# Patient Record
Sex: MALE
[De-identification: names, ages, dates, MRNs, and addresses within clinical notes are randomized per-mention and may not be internally consistent; named-entity substitution may affect disease eponyms.]

---

## 2019-03-17 ENCOUNTER — HOSPITAL ENCOUNTER (EMERGENCY)
Dept: HOSPITAL 88 - ER | Age: 84
Discharge: HOME | End: 2019-03-17
Payer: MEDICARE

## 2019-03-17 VITALS — SYSTOLIC BLOOD PRESSURE: 154 MMHG | DIASTOLIC BLOOD PRESSURE: 79 MMHG

## 2019-03-17 VITALS — BODY MASS INDEX: 25.76 KG/M2 | HEIGHT: 68 IN | WEIGHT: 170 LBS

## 2019-03-17 DIAGNOSIS — R42: Primary | ICD-10-CM

## 2019-03-17 DIAGNOSIS — E11.65: ICD-10-CM

## 2019-03-17 LAB
ALBUMIN SERPL-MCNC: 3.5 G/DL (ref 3.5–5)
ALBUMIN/GLOB SERPL: 0.9 {RATIO} (ref 0.8–2)
ALP SERPL-CCNC: 124 IU/L (ref 40–150)
ALT SERPL-CCNC: 25 IU/L (ref 0–55)
ANION GAP SERPL CALC-SCNC: 11.9 MMOL/L (ref 8–16)
BACTERIA URNS QL MICRO: (no result) /HPF
BASOPHILS # BLD AUTO: 0 10*3/UL (ref 0–0.1)
BASOPHILS NFR BLD AUTO: 0.3 % (ref 0–1)
BILIRUB UR QL: NEGATIVE
BUN SERPL-MCNC: 22 MG/DL (ref 7–26)
BUN/CREAT SERPL: 13 (ref 6–25)
CALCIUM SERPL-MCNC: 8.5 MG/DL (ref 8.4–10.2)
CHLORIDE SERPL-SCNC: 101 MMOL/L (ref 98–107)
CK MB SERPL-MCNC: 2.1 NG/ML (ref 0–5)
CK SERPL-CCNC: 148 IU/L (ref 30–200)
CLARITY UR: CLEAR
CO2 SERPL-SCNC: 25 MMOL/L (ref 22–29)
COLOR UR: YELLOW
DEPRECATED INR PLAS: 1.64
DEPRECATED NEUTROPHILS # BLD AUTO: 5.3 10*3/UL (ref 2.1–6.9)
DEPRECATED RBC URNS MANUAL-ACNC: (no result) /HPF (ref 0–5)
EGFRCR SERPLBLD CKD-EPI 2021: 40 ML/MIN (ref 60–?)
EOSINOPHIL # BLD AUTO: 0.2 10*3/UL (ref 0–0.4)
EOSINOPHIL NFR BLD AUTO: 2.5 % (ref 0–6)
EPI CELLS URNS QL MICRO: (no result) /LPF
ERYTHROCYTE [DISTWIDTH] IN CORD BLOOD: 13.8 % (ref 11.7–14.4)
GLOBULIN PLAS-MCNC: 3.7 G/DL (ref 2.3–3.5)
GLUCOSE SERPLBLD-MCNC: 366 MG/DL (ref 74–118)
HCT VFR BLD AUTO: 45.1 % (ref 38.2–49.6)
HGB BLD-MCNC: 14.9 G/DL (ref 14–18)
KETONES UR QL STRIP.AUTO: NEGATIVE
LEUKOCYTE ESTERASE UR QL STRIP.AUTO: NEGATIVE
LYMPHOCYTES # BLD: 2.3 10*3/UL (ref 1–3.2)
LYMPHOCYTES NFR BLD AUTO: 26 % (ref 18–39.1)
MCH RBC QN AUTO: 30.5 PG (ref 28–32)
MCHC RBC AUTO-ENTMCNC: 33 G/DL (ref 31–35)
MCV RBC AUTO: 92.2 FL (ref 81–99)
MONOCYTES # BLD AUTO: 1 10*3/UL (ref 0.2–0.8)
MONOCYTES NFR BLD AUTO: 11 % (ref 4.4–11.3)
NEUTS SEG NFR BLD AUTO: 59.9 % (ref 38.7–80)
NITRITE UR QL STRIP.AUTO: NEGATIVE
PLATELET # BLD AUTO: 152 X10E3/UL (ref 140–360)
POTASSIUM SERPL-SCNC: 4.9 MMOL/L (ref 3.5–5.1)
PROT UR QL STRIP.AUTO: NEGATIVE
PROTHROMBIN TIME: 20 SECONDS (ref 11.9–14.5)
RBC # BLD AUTO: 4.89 X10E6/UL (ref 4.3–5.7)
SODIUM SERPL-SCNC: 133 MMOL/L (ref 136–145)
SP GR UR STRIP: 1.01 (ref 1.01–1.02)
UROBILINOGEN UR STRIP-MCNC: 0.2 MG/DL (ref 0.2–1)
WBC #/AREA URNS HPF: (no result) /HPF (ref 0–5)

## 2019-03-17 PROCEDURE — 81001 URINALYSIS AUTO W/SCOPE: CPT

## 2019-03-17 PROCEDURE — 82553 CREATINE MB FRACTION: CPT

## 2019-03-17 PROCEDURE — 99284 EMERGENCY DEPT VISIT MOD MDM: CPT

## 2019-03-17 PROCEDURE — 36415 COLL VENOUS BLD VENIPUNCTURE: CPT

## 2019-03-17 PROCEDURE — 82948 REAGENT STRIP/BLOOD GLUCOSE: CPT

## 2019-03-17 PROCEDURE — 85610 PROTHROMBIN TIME: CPT

## 2019-03-17 PROCEDURE — 70450 CT HEAD/BRAIN W/O DYE: CPT

## 2019-03-17 PROCEDURE — 84484 ASSAY OF TROPONIN QUANT: CPT

## 2019-03-17 PROCEDURE — 80053 COMPREHEN METABOLIC PANEL: CPT

## 2019-03-17 PROCEDURE — 71045 X-RAY EXAM CHEST 1 VIEW: CPT

## 2019-03-17 PROCEDURE — 82550 ASSAY OF CK (CPK): CPT

## 2019-03-17 PROCEDURE — 85025 COMPLETE CBC W/AUTO DIFF WBC: CPT

## 2019-03-17 PROCEDURE — 93005 ELECTROCARDIOGRAM TRACING: CPT

## 2019-03-17 PROCEDURE — 83735 ASSAY OF MAGNESIUM: CPT

## 2019-03-17 PROCEDURE — 85730 THROMBOPLASTIN TIME PARTIAL: CPT

## 2019-03-17 NOTE — DIAGNOSTIC IMAGING REPORT
EXAM: CHEST SINGLE (PORTABLE)

DATE: 3/17/2019 7:24 PM  

INDICATION: Dizzy.

COMPARISON: None.



FINDINGS:

LINES/TUBES: None



LUNGS: Mild bibasilar subsegmental atelectasis with mild elevation of the right

hemidiaphragm.



PLEURA: No effusions or pneumothorax.



HEART AND MEDIASTINUM: The cardiac silhouette is borderline enlarged.

Considerations of the aortic arch.



BONES AND SOFT TISSUES: No acute findings.



IMPRESSION:

Mild bibasilar subsegmental atelectasis. No significant change.



Signed by: Dr. SHELLY Baker M.D. on 3/17/2019 8:40 PM

## 2019-03-17 NOTE — DIAGNOSTIC IMAGING REPORT
History:Altered mental status



Comparison studies:

None



Technique:

Axial images were obtained from the skull base to the vertex.

Coronal and sagittal images reconstructed from the axial data.

Dose modulation, iterative reconstruction, and/or weight based adjustment 

of the mA/kV was utilized to reduce the radiation dose to as low as reasonably 

achievable.



Intravenous contrast: None



Findings:



Scalp/skull: 

No abnormalities.



Extra-axial spaces: 

No masses.  No fluid collections.



Brain sulci: Mildly prominent.

Ventricles: Mild compensatory dilatation. No hydrocephalus.



Parenchyma: 

Cortical encephalomalacic changes in the right frontal operculum, anterior

insula and inferior precentral cortex (subcentral lobule) are associated with

hypodense gliotic changes in the underlying frontal white matter and with

compensatory dilatation of the right frontal horn. An old lacunar insult is

centered in the head of the right caudate. No masses, hemorrhage, acute or

chronic cortical vascular insults.



Sellar/suprasellar region: No abnormalities.

Craniocervical junction: Patent foramen magnum.  No Chiari one malformation.



Incidental findings:

Atherosclerotic calcifications in the carotid siphons and vertebral arteries..



Impression:



No acute abnormalities.



Chronic findings:

1.  Mild generalized volume loss.

2.  Cortical right MCA frontal vascular insult (operculum, anterior insula and

inferior precentral cortex)

3.  Focal lacunar insult in the head of the left caudate



Signed by: Dr. Omari Sims M.D. on 3/17/2019 8:39 PM

## 2019-08-18 ENCOUNTER — HOSPITAL ENCOUNTER (EMERGENCY)
Dept: HOSPITAL 88 - ER | Age: 84
Discharge: LEFT BEFORE DISCHARGE | End: 2019-08-18
Payer: MEDICARE

## 2019-08-18 ENCOUNTER — HOSPITAL ENCOUNTER (OUTPATIENT)
Dept: HOSPITAL 88 - ER | Age: 84
Setting detail: OBSERVATION
LOS: 1 days | Discharge: HOME | End: 2019-08-19
Attending: INTERNAL MEDICINE | Admitting: INTERNAL MEDICINE
Payer: MEDICARE

## 2019-08-18 VITALS — HEIGHT: 68 IN | WEIGHT: 170 LBS | BODY MASS INDEX: 25.76 KG/M2

## 2019-08-18 VITALS — SYSTOLIC BLOOD PRESSURE: 148 MMHG | DIASTOLIC BLOOD PRESSURE: 63 MMHG

## 2019-08-18 VITALS — DIASTOLIC BLOOD PRESSURE: 72 MMHG | SYSTOLIC BLOOD PRESSURE: 150 MMHG

## 2019-08-18 VITALS — BODY MASS INDEX: 24.4 KG/M2 | HEIGHT: 68 IN | WEIGHT: 161 LBS

## 2019-08-18 VITALS — DIASTOLIC BLOOD PRESSURE: 80 MMHG | SYSTOLIC BLOOD PRESSURE: 182 MMHG

## 2019-08-18 DIAGNOSIS — Z79.01: ICD-10-CM

## 2019-08-18 DIAGNOSIS — I48.2: ICD-10-CM

## 2019-08-18 DIAGNOSIS — E11.649: Primary | ICD-10-CM

## 2019-08-18 DIAGNOSIS — I10: ICD-10-CM

## 2019-08-18 DIAGNOSIS — Z88.0: ICD-10-CM

## 2019-08-18 DIAGNOSIS — Z79.82: ICD-10-CM

## 2019-08-18 DIAGNOSIS — Z86.73: ICD-10-CM

## 2019-08-18 DIAGNOSIS — I48.91: ICD-10-CM

## 2019-08-18 DIAGNOSIS — E11.42: ICD-10-CM

## 2019-08-18 DIAGNOSIS — E78.5: ICD-10-CM

## 2019-08-18 DIAGNOSIS — K21.9: ICD-10-CM

## 2019-08-18 LAB
ALBUMIN SERPL-MCNC: 3.1 G/DL (ref 3.5–5)
ALBUMIN/GLOB SERPL: 1.1 {RATIO} (ref 0.8–2)
ALP SERPL-CCNC: 118 IU/L (ref 40–150)
ALT SERPL-CCNC: 17 IU/L (ref 0–55)
ANION GAP SERPL CALC-SCNC: 9.1 MMOL/L (ref 8–16)
BACTERIA URNS QL MICRO: (no result) /HPF
BASOPHILS # BLD AUTO: 0 10*3/UL (ref 0–0.1)
BASOPHILS NFR BLD AUTO: 0.2 % (ref 0–1)
BILIRUB UR QL: NEGATIVE
BUN SERPL-MCNC: 23 MG/DL (ref 7–26)
BUN/CREAT SERPL: 22 (ref 6–25)
CALCIUM SERPL-MCNC: 8.9 MG/DL (ref 8.4–10.2)
CHLORIDE SERPL-SCNC: 104 MMOL/L (ref 98–107)
CK MB SERPL-MCNC: 2.2 NG/ML (ref 0–5)
CK SERPL-CCNC: 99 IU/L (ref 30–200)
CLARITY UR: CLEAR
CO2 SERPL-SCNC: 30 MMOL/L (ref 22–29)
COLOR UR: YELLOW
DEPRECATED APTT PLAS QN: 42.2 SECONDS (ref 23.8–35.5)
DEPRECATED INR PLAS: 2.98
DEPRECATED NEUTROPHILS # BLD AUTO: 5.5 10*3/UL (ref 2.1–6.9)
DEPRECATED RBC URNS MANUAL-ACNC: (no result) /HPF (ref 0–5)
EGFRCR SERPLBLD CKD-EPI 2021: > 60 ML/MIN (ref 60–?)
EOSINOPHIL # BLD AUTO: 0.3 10*3/UL (ref 0–0.4)
EOSINOPHIL NFR BLD AUTO: 4.1 % (ref 0–6)
EPI CELLS URNS QL MICRO: (no result) /LPF
ERYTHROCYTE [DISTWIDTH] IN CORD BLOOD: 13.7 % (ref 11.7–14.4)
GLOBULIN PLAS-MCNC: 2.7 G/DL (ref 2.3–3.5)
GLUCOSE SERPLBLD-MCNC: 54 MG/DL (ref 74–118)
HCT VFR BLD AUTO: 39 % (ref 38.2–49.6)
HGB BLD-MCNC: 12.8 G/DL (ref 14–18)
KETONES UR QL STRIP.AUTO: NEGATIVE
LEUKOCYTE ESTERASE UR QL STRIP.AUTO: NEGATIVE
LYMPHOCYTES # BLD: 1.5 10*3/UL (ref 1–3.2)
LYMPHOCYTES NFR BLD AUTO: 18.5 % (ref 18–39.1)
MAGNESIUM SERPL-MCNC: 2.2 MG/DL (ref 1.3–2.1)
MCH RBC QN AUTO: 29.3 PG (ref 28–32)
MCHC RBC AUTO-ENTMCNC: 32.8 G/DL (ref 31–35)
MCV RBC AUTO: 89.2 FL (ref 81–99)
MONOCYTES # BLD AUTO: 0.9 10*3/UL (ref 0.2–0.8)
MONOCYTES NFR BLD AUTO: 10.3 % (ref 4.4–11.3)
NEUTS SEG NFR BLD AUTO: 66.7 % (ref 38.7–80)
NITRITE UR QL STRIP.AUTO: NEGATIVE
PLATELET # BLD AUTO: 164 X10E3/UL (ref 140–360)
POTASSIUM SERPL-SCNC: 4.1 MMOL/L (ref 3.5–5.1)
PROT UR QL STRIP.AUTO: NEGATIVE
PROTHROMBIN TIME: 31.7 SECONDS (ref 11.9–14.5)
RBC # BLD AUTO: 4.37 X10E6/UL (ref 4.3–5.7)
SODIUM SERPL-SCNC: 139 MMOL/L (ref 136–145)
SP GR UR STRIP: 1.01 (ref 1.01–1.02)
UROBILINOGEN UR STRIP-MCNC: 0.2 MG/DL (ref 0.2–1)
WBC #/AREA URNS HPF: (no result) /HPF (ref 0–5)

## 2019-08-18 PROCEDURE — 87086 URINE CULTURE/COLONY COUNT: CPT

## 2019-08-18 PROCEDURE — 82553 CREATINE MB FRACTION: CPT

## 2019-08-18 PROCEDURE — 71045 X-RAY EXAM CHEST 1 VIEW: CPT

## 2019-08-18 PROCEDURE — 36415 COLL VENOUS BLD VENIPUNCTURE: CPT

## 2019-08-18 PROCEDURE — 93005 ELECTROCARDIOGRAM TRACING: CPT

## 2019-08-18 PROCEDURE — 85730 THROMBOPLASTIN TIME PARTIAL: CPT

## 2019-08-18 PROCEDURE — 80053 COMPREHEN METABOLIC PANEL: CPT

## 2019-08-18 PROCEDURE — 82948 REAGENT STRIP/BLOOD GLUCOSE: CPT

## 2019-08-18 PROCEDURE — 85025 COMPLETE CBC W/AUTO DIFF WBC: CPT

## 2019-08-18 PROCEDURE — 99284 EMERGENCY DEPT VISIT MOD MDM: CPT

## 2019-08-18 PROCEDURE — 83735 ASSAY OF MAGNESIUM: CPT

## 2019-08-18 PROCEDURE — 72125 CT NECK SPINE W/O DYE: CPT

## 2019-08-18 PROCEDURE — 81001 URINALYSIS AUTO W/SCOPE: CPT

## 2019-08-18 PROCEDURE — 85610 PROTHROMBIN TIME: CPT

## 2019-08-18 PROCEDURE — 70450 CT HEAD/BRAIN W/O DYE: CPT

## 2019-08-18 PROCEDURE — 82550 ASSAY OF CK (CPK): CPT

## 2019-08-18 PROCEDURE — 84484 ASSAY OF TROPONIN QUANT: CPT

## 2019-08-18 RX ADMIN — INSULIN LISPRO SCH UNIT: 100 INJECTION, SOLUTION INTRAVENOUS; SUBCUTANEOUS at 16:30

## 2019-08-18 RX ADMIN — INSULIN LISPRO SCH UNIT: 100 INJECTION, SOLUTION INTRAVENOUS; SUBCUTANEOUS at 21:00

## 2019-08-18 RX ADMIN — HYDRALAZINE HYDROCHLORIDE PRN MG: 20 INJECTION INTRAMUSCULAR; INTRAVENOUS at 17:34

## 2019-08-18 NOTE — DIAGNOSTIC IMAGING REPORT
History:Altered mental status, recent fall



Comparison studies:

Head CT on 3/17/2019



Technique:

Axial images were obtained from the skull base to the vertex.

Coronal and sagittal images reconstructed from the axial data.

Dose modulation, iterative reconstruction, and/or weight based adjustment 

of the mA/kV was utilized to reduce the radiation dose to as low as reasonably 

achievable.



Intravenous contrast: None



Findings:



Scalp/skull: 

No abnormalities.



Extra-axial spaces: 

No masses.  No fluid collections.



Brain sulci: Mildly prominent.

Ventricles: Mild compensatory dilatation. No hydrocephalus.



Parenchyma: 

Cortical encephalomalacic changes in the anterior division of the right MCA

involve the anterior anterior insula and the overlying inferior frontal

degenerative and subcentral lobule. Interval is associated with regional volume

loss and chronic changes in the underlying white matter. A smaller focal

similar insult is centered in the angular gyrus of the left parietal lobe.



No masses, hemorrhage, acute or additional chronic cortical vascular insults.



Sellar/suprasellar region: No abnormalities.

Craniocervical junction: Patent foramen magnum.  No Chiari one malformation.



Incidental findings:

Atherosclerotic calcifications in the carotid siphons .



Impression:



No acute abnormalities.



Chronic findings:

1.  Mild generalized volume loss.

2.  Focal distal cortical right frontal and left parietal MCA vascular insults.

The right insult was present on 3/17/2019 but the now right chronic insult was

not.



Signed by: Dr. Omari Sims M.D. on 8/18/2019 11:43 AM

## 2019-08-18 NOTE — NUR
ARTIE Benson in room to advise pt of benefits of staying and dangers of leaving; 
pt acknowledges understanding and laughs and says "I understand. I just want to 
go home." pt signs ama form with ARTIE Benson and ARTIE Samuel as witnesses

## 2019-08-18 NOTE — HISTORY AND PHYSICAL
HISTORY OF PRESENT ILLNESS:  An 84-year-old male with past medical history positive for

CVA, TIA, hypertension, diabetes, atrial fibrillation, came to the hospital because of

hypoglycemic episode.  The patient is wants to go home.  He is going to leave against

medical advice.  I explained to the patient the consequences like having hypoglycemia

going to hypoglycemic coma at home.  The patient is insisting that he still wants to go

home.  The family member wants him to stay here, but the patient is alert and competent

to make decisions, so I explained the risks of going home including hypoglycemic coma

and even death, but the patient does not want to listen.  He want to go home anyway. 

 

REVIEW OF SYSTEMS:

CARDIOVASCULAR:  No chest pain or palpitation. 

RESPIRATORY:  No shortness of breath.  No cough. 

GASTROINTESTINAL:  No nausea or vomiting.  No diarrhea. 

GENITOURINARY:  No frequency or dysuria.

 

PAST MEDICAL HISTORY:  Positive for diabetes, hypertension, chronic atrial fibrillation,

history of TIA and CVA. 

 

SOCIAL HISTORY:  He smokes.  He drinks alcohol occasionally.

 

PHYSICAL EXAMINATION:

HEART:  Showed irregularly irregular heart rate.  Normal S1, S2 sound. 

LUNGS:  Clear bilaterally. 

ABDOMEN:  Soft. 

EXTREMITIES:  Show no evidence of cyanosis or hematoma. 

NEUROLOGIC:  Alert and oriented x3.  No motor deficits.

FINAL IMPRESSION:  

1. Hypoglycemia.

2. Chronic atrial fibrillation.

3. Cerebrovascular accident.

4. Hypertension.

5. Diabetes mellitus type 2.

 

PLAN OF TREATMENT:  Continue D10W.  Continue monitoring the blood sugar every 2 hours.

We are going to put him of course hold on the hypoglycemic agents as I said the patient

wants to leave against medical advice.  He is aware of the consequences including

hypoglycemic coma and death, but he is not very reasonable right now.  The family is

trying to convince him to stay home, but he going to leave against medical advice. 

 

 

 

 

______________________________

MD CAROLINE Rivers/PHILIP

D:  08/18/2019 12:47:53

T:  08/18/2019 14:46:38

Job #:  [id number]

## 2019-08-18 NOTE — NUR
RECEIVED NOTIFICATION FROM LAB FOR CRITICAL GLUCOSE OF 54, COMPLETED BEDSIDE 
FINGERSTICK NOTED TO BE 94; INFORMED PRIMARY NURSE ARTIE COLES. PT IN STRETCHER 
AWAKE AND ALERT, NAD NOTED; RECEIVING INFUSION OF D10 @75 CC/HR, SEE eMAR.

## 2019-08-18 NOTE — NUR
INITIAL ASSESSMENT COMPLETE, NO DISTRESS NOTED, CALL LIGHT IN REACH, VS AND BS STABLE, TOLD 
TO CALL FOR NEEDS

## 2019-08-18 NOTE — NUR
pt states he wants to go home and does not want to be admitted; Dr Cruz and Dr Kapadia both notified; Dr Cruz in pt room to discuss

## 2019-08-18 NOTE — XMS REPORT
Patient Summary Document

                             Created on: 2019



REYES, GILBERTO

External Reference #: [id number]

: 1935

Sex: Male



Demographics







                          Address                   [address] [place] [address] [id number]



 

                          Home Phone                [phone]

 

                          Preferred Language        Unknown

 

                          Marital Status            Unknown

 

                          [place] Affiliation     Unknown

 

                          Race                      Unknown

 

                          Ethnic Group              Unknown





Author







                          Author                    [facility]

 

                          Address                   Unknown

 

                          Phone                     Unavailable







Care Team Providers







                    Care Team Member Name    Role                Phone

 

                    SWEET, A LAIRD      Unavailable         Unavailable







Problems

This patient has no known problems.



Allergies, Adverse Reactions, Alerts

This patient has no known allergies or adverse reactions.



Medications

This patient has no known medications.



Results







           Test Description    Test Time    Test Comments    Text Results    Atomic Results    Result

 Comments

 

                CHEST SINGLE (PORTABLE)    2019 20:36:00                                                   

                                                       [place] [address]      Patient Name: REYES,GILBERTO                         
         MR #: [id number]                     : 1935                   
               Age/Sex: 84/M  Acct #: [id number]                              
Req #: [id number]  Adm Physician:                                               
      Ordered by: ALFONSO CONRAD MD                            Report #: [id number]
[id number]        Location: ER                                      Room/Bed:         
           
___________________________________________________________________________________________________
   Procedure: [id number] DX/CHEST SINGLE (PORTABLE)  Exam Date:                  
          Exam Time:                                               REPORT 
STATUS: Signed    EXAM: CHEST SINGLE (PORTABLE)   DATE: 3/17/2019 7:24 PM     
INDICATION: Dizzy.   COMPARISON: None.      FINDINGS:   LINES/TUBES: None      
LUNGS: Mild bibasilar subsegmental atelectasis with mild elevation of the right 
 hemidiaphragm.      PLEURA: No effusions or pneumothorax.      HEART AND 
MEDIASTINUM: The cardiac silhouette is borderline enlarged.   Considerations of 
the aortic arch.      BONES AND SOFT TISSUES: No acute findings.      
IMPRESSION:   Mild bibasilar subsegmental atelectasis. No significant change.   
  Signed by: Dr. SHELLY Jackson M.D. on 3/17/2019 8:40 PM        Dictated
By: MIGUELITO JACKSON MD, MD  Electronically Signed By: MIGUELITO JACKSON MD, MD on 
19  Transcribed By: HORACIO on 19       COPY TO:   
ALFONSO CONRAD MD                         

 

                CT BRAIN WO     2019 20:31:00                                                               

                                           [place] [address]    
 Patient Name: REYES,GILBERTO                                   MR #: [id number]
                    : 1935                                   Age/Sex: 
84/M  Acct #: [id number]                              Req #: [id number]  Adm 
Physician:                                                      Ordered by: 
ALFONSO CONRAD MD                            Report #: [id number]        
Location: ER                                      Room/Bed:                     
___________________________________________________________________________________________________
   Procedure: [id number] CT/CT BRAIN WO  Exam Date:                             
Exam Time:                                               REPORT STATUS: Signed  
 History:Altered mental status      Comparison studies:   None      Technique:  
Axial images were obtained from the skull base to the vertex.   Coronal and 
sagittal images reconstructed from the axial data.   Dose modulation, iterative 
reconstruction, and/or weight based adjustment    of the mA/kV was utilized to 
reduce the radiation dose to as low as reasonably    achievable.      
Intravenous contrast: None      Findings:      Scalp/skull:    No abnormalities.
     Extra-axial spaces:    No masses.  No fluid collections.      Brain sulci: 
Mildly prominent.   Ventricles: Mild compensatory dilatation. No hydrocephalus. 
    Parenchyma:    Cortical encephalomalacic changes in the right frontal 
operculum, anterior   insula and inferior precentral cortex (subcentral lobule) 
are associated with   hypodense gliotic changes in the underlying frontal white 
matter and with   compensatory dilatation of the right frontal horn. An old 
lacunar insult is   centered in the head of the right caudate. No masses, 
hemorrhage, acute or   chronic cortical vascular insults.      Sellar/supras
ellar region: No abnormalities.   Craniocervical junction: Patent foramen 
magnum.  No Chiari one malformation.      Incidental findings:   Atherosclerotic
calcifications in the carotid siphons and vertebral arteries..      Impression: 
    No acute abnormalities.      Chronic findings:   1.  Mild generalized volume
loss.   2.  Cortical right MCA frontal vascular insult (operculum, anterior 
insula and   inferior precentral cortex)   3.  Focal lacunar insult in the head 
of the left caudate      Signed by: Dr. Omari Sims M.D. on 3/17/2019 
8:39 PM        Dictated By: OMARI SIMS MD, MD  Electronically Signed 
By: OMARI SIMS MD, MD on 19  Transcribed By: HORACIO on 
19       COPY TO:   ALFONSO CONRAD MD

## 2019-08-18 NOTE — NUR
Dr Cruz advises pt of benefits of staying in the hosp and dangers of leaving 
up to and including death; pt states, "ok, I'll take my chances." Dr Cruz asks 
pt questions to make sure pt is alert and oriented and pt answers all questions 
appropriately and states, "I want to go"

## 2019-08-18 NOTE — NUR
RECEIVED TO RM AAOX3, NO DISTRESS NOTED,UPDATED ON POC VOICED UNDERSTANDING, DENIES PAIN AT 
THIS TIME, IVF INFUSING TO R AC 18G NO SS OF INFILTRATION NOTED, BRUISE NOTED TO RL BACK, 
ORIENTED TO RM, NO OTHER CO VOCIED CALL LIGHT IN REACH WILL CONTINUE TO MONITOR

## 2019-08-18 NOTE — DIAGNOSTIC IMAGING REPORT
Examination: Single AP view of the chest.



COMPARISON: AP chest 3/17/2019



INDICATION: Hypoglycemia

    

IMPRESSION:

 

1.  Lines and Tubes: None

2.  Lungs are well-inflated.  No consolidation or effusion.

3.  Mild prominence of the cardiac silhouette, which may be partly due to AP

portable projection.  Central pulmonary venous congestion.

4.  No acute bony abnormalities.



Signed by: Dr. Odell Jenkins M.D. on 8/18/2019 11:23 AM

## 2019-08-18 NOTE — DIAGNOSTIC IMAGING REPORT
History: Fall

Comparison studies: None



Technique: 

Axial images were obtained through the cervical region..

Coronal and sagittal images reconstructed from the axial data.

Dose modulation, iterative reconstruction, and/or weight based adjustment 

of the mA/kV was utilized to reduce the radiation dose to as low as reasonably 

achievable.



Intravenous contrast: None



Findings:



Fractures: None.

Soft tissues: No gross abnormalities.



Atlantoaxial articulation: Intact.

Alignment: Normal lordosis. No scoliosis.

Cervicomedullary junction: No abnormalities. The foramen magnum is patent.



Vertebrae: 

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

No infection or neoplasm.



Degenerative changes: 

Mildly degenerated discs from C4 to T2 (mostly anterior osteophytes).

Foraminal stenosis, moderate at C3-4 and C5-6 due to facet and uncovertebral

arthrosis.

Moderate degenerative spinal canal stenosis at C5-6 due to a disc osteophyte

complex

Superimposed facet arthrosis at C7-T1.



Incidental coarse atherosclerotic calcifications at the carotid bulbs.



IMPRESSION:



1.  No acute abnormalities.



2.  Cannot adequately evaluate for ligament, spinal cord and or vascular

abnormalities.



3.  Degenerative changes as described.



Signed by: Dr. Omari Sims M.D. on 8/18/2019 11:48 AM

## 2019-08-19 VITALS — SYSTOLIC BLOOD PRESSURE: 171 MMHG | DIASTOLIC BLOOD PRESSURE: 88 MMHG

## 2019-08-19 VITALS — DIASTOLIC BLOOD PRESSURE: 79 MMHG | SYSTOLIC BLOOD PRESSURE: 149 MMHG

## 2019-08-19 VITALS — SYSTOLIC BLOOD PRESSURE: 147 MMHG | DIASTOLIC BLOOD PRESSURE: 81 MMHG

## 2019-08-19 VITALS — SYSTOLIC BLOOD PRESSURE: 189 MMHG | DIASTOLIC BLOOD PRESSURE: 91 MMHG

## 2019-08-19 VITALS — SYSTOLIC BLOOD PRESSURE: 172 MMHG | DIASTOLIC BLOOD PRESSURE: 92 MMHG

## 2019-08-19 VITALS — SYSTOLIC BLOOD PRESSURE: 176 MMHG | DIASTOLIC BLOOD PRESSURE: 81 MMHG

## 2019-08-19 VITALS — DIASTOLIC BLOOD PRESSURE: 65 MMHG | SYSTOLIC BLOOD PRESSURE: 132 MMHG

## 2019-08-19 VITALS — DIASTOLIC BLOOD PRESSURE: 91 MMHG | SYSTOLIC BLOOD PRESSURE: 189 MMHG

## 2019-08-19 LAB
ALBUMIN SERPL-MCNC: 3.3 G/DL (ref 3.5–5)
ALBUMIN/GLOB SERPL: 1.1 {RATIO} (ref 0.8–2)
ALP SERPL-CCNC: 130 IU/L (ref 40–150)
ALT SERPL-CCNC: 17 IU/L (ref 0–55)
ANION GAP SERPL CALC-SCNC: 8.6 MMOL/L (ref 8–16)
BASOPHILS # BLD AUTO: 0 10*3/UL (ref 0–0.1)
BASOPHILS NFR BLD AUTO: 0.4 % (ref 0–1)
BUN SERPL-MCNC: 17 MG/DL (ref 7–26)
BUN/CREAT SERPL: 18 (ref 6–25)
CALCIUM SERPL-MCNC: 9 MG/DL (ref 8.4–10.2)
CHLORIDE SERPL-SCNC: 101 MMOL/L (ref 98–107)
CO2 SERPL-SCNC: 30 MMOL/L (ref 22–29)
DEPRECATED NEUTROPHILS # BLD AUTO: 5.3 10*3/UL (ref 2.1–6.9)
EGFRCR SERPLBLD CKD-EPI 2021: > 60 ML/MIN (ref 60–?)
EOSINOPHIL # BLD AUTO: 0.5 10*3/UL (ref 0–0.4)
EOSINOPHIL NFR BLD AUTO: 5.7 % (ref 0–6)
ERYTHROCYTE [DISTWIDTH] IN CORD BLOOD: 13.6 % (ref 11.7–14.4)
GLOBULIN PLAS-MCNC: 3.1 G/DL (ref 2.3–3.5)
GLUCOSE SERPLBLD-MCNC: 81 MG/DL (ref 74–118)
HCT VFR BLD AUTO: 43.2 % (ref 38.2–49.6)
HGB BLD-MCNC: 13.9 G/DL (ref 14–18)
LYMPHOCYTES # BLD: 2.1 10*3/UL (ref 1–3.2)
LYMPHOCYTES NFR BLD AUTO: 23.9 % (ref 18–39.1)
MCH RBC QN AUTO: 28.9 PG (ref 28–32)
MCHC RBC AUTO-ENTMCNC: 32.2 G/DL (ref 31–35)
MCV RBC AUTO: 89.8 FL (ref 81–99)
MONOCYTES # BLD AUTO: 1 10*3/UL (ref 0.2–0.8)
MONOCYTES NFR BLD AUTO: 11 % (ref 4.4–11.3)
NEUTS SEG NFR BLD AUTO: 58.8 % (ref 38.7–80)
PLATELET # BLD AUTO: 171 X10E3/UL (ref 140–360)
POTASSIUM SERPL-SCNC: 3.6 MMOL/L (ref 3.5–5.1)
RBC # BLD AUTO: 4.81 X10E6/UL (ref 4.3–5.7)
SODIUM SERPL-SCNC: 136 MMOL/L (ref 136–145)

## 2019-08-19 RX ADMIN — INSULIN LISPRO SCH UNIT: 100 INJECTION, SOLUTION INTRAVENOUS; SUBCUTANEOUS at 16:30

## 2019-08-19 RX ADMIN — INSULIN LISPRO SCH UNIT: 100 INJECTION, SOLUTION INTRAVENOUS; SUBCUTANEOUS at 07:30

## 2019-08-19 RX ADMIN — INSULIN LISPRO SCH UNIT: 100 INJECTION, SOLUTION INTRAVENOUS; SUBCUTANEOUS at 12:37

## 2019-08-19 RX ADMIN — HYDRALAZINE HYDROCHLORIDE PRN MG: 20 INJECTION INTRAMUSCULAR; INTRAVENOUS at 09:01

## 2019-08-19 NOTE — NUR
Pt leaving unit at this time. Daughter is present. Pt given juice as requested by patient. 
Pt given prescription written by Dr. Kapadia. Discharge paperwork discussed with pt and pts 
daughter. Pt and family have no questions. Pt denies pain at this time. Vital signs stable.

## 2019-08-19 NOTE — NUR
RECEIVED PATIENT AWAKE RESTING IN BED NO S/S OF DISTRESS. BED LOW, WHEELS LOCKED, SIDE RAILS 
X2. CALL LIGHT IN REACH WILL CONTINUE TO MONITOR PATIENT.

## 2019-08-19 NOTE — NUR
Report received at this time. Pt resting comfortably sitting at side of bed. Pt daughter is 
present at bedside. Pt has no c/o dizziness or pain at this time. Will continue to monitor.

## 2019-08-20 NOTE — DISCHARGE SUMMARY
HISTORY:  An 84-year-old  male with past medical history positive for diabetes,

hypertension, history of chronic atrial fibrillation, and coronary artery disease, came

here because of hypoglycemia.  At the beginning, he was refusing to stay in the

hospital, but later his family convinced him to stay.  Blood sugar is much better right

now. 

 

PHYSICAL EXAMINATION:

VITAL SIGNS:  Blood pressure 149/79, temperature 96.0, heart rate 64 per minute,

respiratory rate 19 per minute, and oxygen saturation 97%. 

HEART:  Showed irregularly irregular heart rate.  Normal S1 and S2 sound. 

LUNGS:  Clear bilaterally. 

ABDOMEN:  Soft.

LABORATORY DATA:  On the BMP; sodium 136, potassium 3.6, chloride 101, CO2 of 30, BUN

17, creatinine 0.82, glucose 81.  CBC; white blood count 8.94, hemoglobin 13.9,

hematocrit 43.2, and platelet count 171,000.  AST 24, ALT 17, total bilirubin 0.9,

alkaline phosphatase 130. 

 

FINAL IMPRESSION:  

1. Episode of hypoglycemia, which is resolved.

2. Hypertension.

3. 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

4. Chronic atrial fibrillation.

5. Hypertension.

 

PLAN OF TREATMENT:  The patient once in his home, once the blood sugar is more than 150,

he will resume the Lantus, which we are going to decrease from 30 to 20 units in the

morning and from 20 to 15 units in the evening.  Continue monitoring blood sugar before

meals and at bedtime.  The patient is aware of symptoms of hypoglycemia and how to

regulate his blood sugar.  His daughter will be involved in the case.  Continue rest of

the home medications. 

 

 

 

 

______________________________

MD CAROLINE Rivers/PHILIP

D:  08/19/2019 20:16:25

T:  08/20/2019 05:46:05

Job #:  [id number]

## 2019-11-10 ENCOUNTER — HOSPITAL ENCOUNTER (EMERGENCY)
Dept: HOSPITAL 88 - ER | Age: 84
LOS: 1 days | Discharge: HOME | End: 2019-11-11
Payer: COMMERCIAL

## 2019-11-10 VITALS — WEIGHT: 161 LBS | BODY MASS INDEX: 24.4 KG/M2 | HEIGHT: 68 IN

## 2019-11-10 DIAGNOSIS — K59.00: Primary | ICD-10-CM

## 2019-11-10 DIAGNOSIS — K64.4: ICD-10-CM

## 2019-11-10 PROCEDURE — 36415 COLL VENOUS BLD VENIPUNCTURE: CPT

## 2019-11-10 PROCEDURE — 74018 RADEX ABDOMEN 1 VIEW: CPT

## 2019-11-10 PROCEDURE — 99283 EMERGENCY DEPT VISIT LOW MDM: CPT

## 2019-11-10 PROCEDURE — 82948 REAGENT STRIP/BLOOD GLUCOSE: CPT

## 2019-11-11 VITALS — DIASTOLIC BLOOD PRESSURE: 93 MMHG | SYSTOLIC BLOOD PRESSURE: 158 MMHG

## 2019-11-11 NOTE — DIAGNOSTIC IMAGING REPORT
Exam: KUB - 2 views



Clinical History: Constipation.



Comparison: None.



Findings/Impression

Examination is limited by motion and technique. Moderate amount of stool in the

colon. Air-filled colonic loops. Nonspecific bowel gas pattern. No evidence of

large volume pneumoperitoneum. No acute osseous abnormality. 



Signed by: Dr. Irene Coker MD on 11/11/2019 1:29 AM

## 2019-12-13 ENCOUNTER — HOSPITAL ENCOUNTER (OUTPATIENT)
Dept: HOSPITAL 88 - ER | Age: 84
Setting detail: OBSERVATION
Discharge: LEFT BEFORE DISCHARGE | End: 2019-12-13
Attending: INTERNAL MEDICINE | Admitting: INTERNAL MEDICINE
Payer: MEDICARE

## 2019-12-13 VITALS — WEIGHT: 161 LBS | HEIGHT: 68 IN | BODY MASS INDEX: 24.4 KG/M2

## 2019-12-13 DIAGNOSIS — Z79.4: ICD-10-CM

## 2019-12-13 DIAGNOSIS — E78.00: ICD-10-CM

## 2019-12-13 DIAGNOSIS — I12.9: ICD-10-CM

## 2019-12-13 DIAGNOSIS — E11.649: Primary | ICD-10-CM

## 2019-12-13 DIAGNOSIS — E11.22: ICD-10-CM

## 2019-12-13 DIAGNOSIS — N18.3: ICD-10-CM

## 2019-12-13 DIAGNOSIS — Z86.73: ICD-10-CM

## 2019-12-13 DIAGNOSIS — Z88.0: ICD-10-CM

## 2019-12-13 LAB
ALBUMIN SERPL-MCNC: 3.4 G/DL (ref 3.5–5)
ALBUMIN/GLOB SERPL: 1.1 {RATIO} (ref 0.8–2)
ALP SERPL-CCNC: 132 IU/L (ref 40–150)
ALT SERPL-CCNC: 24 IU/L (ref 0–55)
ANION GAP SERPL CALC-SCNC: 12.2 MMOL/L (ref 8–16)
BACTERIA URNS QL MICRO: (no result) /HPF
BASOPHILS # BLD AUTO: 0 10*3/UL (ref 0–0.1)
BASOPHILS NFR BLD AUTO: 0.2 % (ref 0–1)
BILIRUB UR QL: NEGATIVE
BUN SERPL-MCNC: 22 MG/DL (ref 7–26)
BUN/CREAT SERPL: 18 (ref 6–25)
CALCIUM SERPL-MCNC: 9.2 MG/DL (ref 8.4–10.2)
CHLORIDE SERPL-SCNC: 102 MMOL/L (ref 98–107)
CK MB SERPL-MCNC: 2.3 NG/ML (ref 0–5)
CK SERPL-CCNC: 79 IU/L (ref 30–200)
CLARITY UR: CLEAR
CO2 SERPL-SCNC: 29 MMOL/L (ref 22–29)
COLOR UR: YELLOW
DEPRECATED APTT PLAS QN: 42.3 SECONDS (ref 23.8–35.5)
DEPRECATED INR PLAS: 2.01
DEPRECATED NEUTROPHILS # BLD AUTO: 6.1 10*3/UL (ref 2.1–6.9)
DEPRECATED RBC URNS MANUAL-ACNC: (no result) /HPF (ref 0–5)
EGFRCR SERPLBLD CKD-EPI 2021: 57 ML/MIN (ref 60–?)
EOSINOPHIL # BLD AUTO: 0.1 10*3/UL (ref 0–0.4)
EOSINOPHIL NFR BLD AUTO: 1.5 % (ref 0–6)
EPI CELLS URNS QL MICRO: (no result) /LPF
ERYTHROCYTE [DISTWIDTH] IN CORD BLOOD: 14.6 % (ref 11.7–14.4)
GLOBULIN PLAS-MCNC: 3.2 G/DL (ref 2.3–3.5)
GLUCOSE SERPLBLD-MCNC: 95 MG/DL (ref 74–118)
HCT VFR BLD AUTO: 45.3 % (ref 38.2–49.6)
HGB BLD-MCNC: 14.6 G/DL (ref 14–18)
KETONES UR QL STRIP.AUTO: NEGATIVE
LEUKOCYTE ESTERASE UR QL STRIP.AUTO: NEGATIVE
LYMPHOCYTES # BLD: 1.5 10*3/UL (ref 1–3.2)
LYMPHOCYTES NFR BLD AUTO: 18.1 % (ref 18–39.1)
MCH RBC QN AUTO: 28.7 PG (ref 28–32)
MCHC RBC AUTO-ENTMCNC: 32.2 G/DL (ref 31–35)
MCV RBC AUTO: 89 FL (ref 81–99)
MONOCYTES # BLD AUTO: 0.7 10*3/UL (ref 0.2–0.8)
MONOCYTES NFR BLD AUTO: 7.9 % (ref 4.4–11.3)
NEUTS SEG NFR BLD AUTO: 71.9 % (ref 38.7–80)
NITRITE UR QL STRIP.AUTO: NEGATIVE
PLATELET # BLD AUTO: 166 X10E3/UL (ref 140–360)
POTASSIUM SERPL-SCNC: 4.2 MMOL/L (ref 3.5–5.1)
PROT UR QL STRIP.AUTO: NEGATIVE
PROTHROMBIN TIME: 23.4 SECONDS (ref 11.9–14.5)
RBC # BLD AUTO: 5.09 X10E6/UL (ref 4.3–5.7)
SODIUM SERPL-SCNC: 139 MMOL/L (ref 136–145)
SP GR UR STRIP: 1.01 (ref 1.01–1.02)
UROBILINOGEN UR STRIP-MCNC: 0.2 MG/DL (ref 0.2–1)
WBC #/AREA URNS HPF: (no result) /HPF (ref 0–5)

## 2019-12-13 PROCEDURE — 99284 EMERGENCY DEPT VISIT MOD MDM: CPT

## 2019-12-13 PROCEDURE — 85025 COMPLETE CBC W/AUTO DIFF WBC: CPT

## 2019-12-13 PROCEDURE — 80053 COMPREHEN METABOLIC PANEL: CPT

## 2019-12-13 PROCEDURE — 70450 CT HEAD/BRAIN W/O DYE: CPT

## 2019-12-13 PROCEDURE — 85610 PROTHROMBIN TIME: CPT

## 2019-12-13 PROCEDURE — 82550 ASSAY OF CK (CPK): CPT

## 2019-12-13 PROCEDURE — 71045 X-RAY EXAM CHEST 1 VIEW: CPT

## 2019-12-13 PROCEDURE — 81001 URINALYSIS AUTO W/SCOPE: CPT

## 2019-12-13 PROCEDURE — 85730 THROMBOPLASTIN TIME PARTIAL: CPT

## 2019-12-13 PROCEDURE — 36415 COLL VENOUS BLD VENIPUNCTURE: CPT

## 2019-12-13 PROCEDURE — 82553 CREATINE MB FRACTION: CPT

## 2019-12-13 PROCEDURE — 93005 ELECTROCARDIOGRAM TRACING: CPT

## 2019-12-13 PROCEDURE — 84484 ASSAY OF TROPONIN QUANT: CPT

## 2019-12-13 NOTE — NUR
pt daughter at bedside attempting to talk pt into staying; dr cano also at pt 
bedside with daughter pt remains adamant about leaving ama; dr cano explains 
again the risks/benefits of signing ama/staying in the hosp; pt states he wants 
to go home

## 2019-12-13 NOTE — HISTORY AND PHYSICAL
HISTORY OF PRESENT ILLNESS:  The patient is an 84-year-old male, who had a past medical

history mainly positive for CVA, hypertension, diabetes, came to the hospital

unresponsive due to hypoglycemia.  The patient received IV glucose.  He is much more

alert and awake today, but he wants to leave against medical advice.  I explained the

patient that he can slip into coma due to the fact that the oral hypoglycemic agents may

last between 24-48 hours before they get out of his system.  The patient still is

adamant about leaving against medical advice, so he is aware of the consequences.  He is

aware that he can slip into coma and die and he still wants to leave against medical

advice.  The daughter is on the way.  We are going to have her talk to him to see he can

be more reasonable; every time he came to the hospital, he immediately wants to leave

against medical advice. 

 

REVIEW OF SYSTEMS:

CARDIOVASCULAR:  No chest pain or palpitation. 

RESPIRATORY:  No shortness of breath.  No cough. 

GASTROINTESTINAL:  No nausea or vomiting.  No diarrhea. 

GENITOURINARY:  No frequency.  No dysuria.

 

ALLERGIES:  HE IS ALLERGIC TO PENICILLIN.

 

SOCIAL HISTORY:  He smokes.  He does not drink.

 

PAST MEDICAL HISTORY:  Mainly positive for diabetes, hypertension, and

hypercholesterolemia. 

 

PHYSICAL EXAMINATION:

HEART:  Showed regular rhythm.  Normal S1 and S2 sound. 

EXTREMITIES:  Show no evidence of cyanosis or hematoma.

LABORATORY DATA:  On the blood work, we have BMP; sodium 139, potassium 4.2, chloride

102, CO2 of 29, BUN 22, creatinine 1.22, glucose 195.  On CBC; white blood count 8.45,

hemoglobin 14.6, hematocrit 45.3, platelet count 133,000, PT 23.4, INR 2.01, PTT 42.3,

AST 27, ALT 24, total bilirubin 1.2, alkaline phosphatase 132. 

 

FINAL IMPRESSION:  

1. Episode of hypoglycemia. 

2. 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 

3. Chronic renal failure stage 3 secondary to the possible diabetic nephropathy.

4. Hypertension with chronic renal failure.

5. History of cerebrovascular accident.  

6. Hypercholesterolemia.

 

PLAN OF TREATMENT:  Going to resume home medication except insulin and oral hypoglycemic

agents.  The patient, as I said, has been told to stay, but the patient wants to leave

against medical advice.  The daughter is on the way to try to convince him.  He is aware

of the consequences most likely the hypoglycemia will return since the half-life of oral

hypoglycemic 

agents is more than between 24-48 hours.  The patient is aware of that and he still

wants to leave against medical advice. 

 

 

 

 

______________________________

MD CAROLINE Rivers/PHILIP

D:  12/13/2019 14:26:21

T:  12/13/2019 21:37:40

Job #:  [id number]

## 2019-12-13 NOTE — NUR
pt states he does not want to stay and wants to sign out ama; pt daughter 
called and notified states she will try to come back to talk with pt about 
staying; md notified

## 2019-12-13 NOTE — NUR
pt signed out ama stating he understands and nurse explains to him he can 
always come back for new or worsening symptoms

## 2019-12-13 NOTE — NUR
dr cano talked with pt about signing out ama explaining the benefits of 
continuing hospitalization due to pt condition; pt acknowledges his condition 
and is aware of benefits of staying continues to states he wants to go home and 
that he does not like hospitals; md explains risks of leaving ama up to and 
including death pt who is a&o x 3 answers all questions appropriately states he 
understands and would still prefer to go home; pt made aware that he can always 
contact his physician, call 911, or return to er for new or worsening symptoms; 
md tells pt to at least stay long enough for family member to pick him up and 
if he still feels he wants to sign out ama then, he can do so at that time; pt 
agrees

## 2019-12-13 NOTE — DIAGNOSTIC IMAGING REPORT
EXAMINATION: Head CT 



HISTORY: Alteration of consciousness, hypoglycemia, status post fall with head

trauma

COMPARISON: Head CT 8/18/2019

TECHNIQUE: Multidetector axial images were obtained without contrast from the

foramen magnum to the vertex . The images were reconstructed using brain and

bone algorithms.  Thin section brain images were reformatted into coronal and

sagittal planes.

Image quality: Motion/streaking artifact limits the evaluation of the skull

base and posterior cranial fossa.

Dose modulation, iterative reconstruction, and/or weight based adjustment of

the mA/kV was utilized to reduce the radiation dose to as low as reasonably

achievable.





FINDINGS:



     Parenchyma: 

Unchanged cortical and subcortical encephalomalacia in the anterior division of

the right MCA involving the anterior anterior insula and the overlying inferior

frontal gyrus and subcentral lobule. Associated with regional volume loss and

chronic changes in the underlying white matter. A smaller focal similar insult

is centered in the angular gyrus of the left parietal lobe left lateral

occipital cortex.

 No  mass or hemorrhage. No CT evidence of acute territorial vascular insult. 

    

     Extra-axial spaces:No abnormal density. No extra-axial fluid collections 

     Brain volume: Normal for age. 

     Ventricles: No hydrocephalus or displacement.  

     Arteries: No density suggestive of thrombus. 

     Dural sinuses: No abnormal density. 

     Extra-axial spaces: No abnormal density. 

     Foramen magnum: No mass, Chiari malformation, or basilar invagination. 

     Sella: No obvious mass.  

     Paranasal/mastoid sinuses: Imaged portions unremarkable. 

     Skull/Scalp: No lytic or blastic lesions.  No fractures. 



IMPRESSION:



1.  No acute posttraumatic intracranial abnormalities, particularly no

hemorrhage.

2.  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

compared to head CT of 8/18/2019.



Signed by: Dr. Janny Duke M.D. on 12/13/2019 12:19 PM

## 2019-12-13 NOTE — DIAGNOSTIC IMAGING REPORT
EXAMINATION:  CHEST SINGLE (PORTABLE)    



INDICATION: Altered mental status. 



COMPARISON:  Chest radiograph 8/18/19. 

     

FINDINGS:

TUBES and LINES:  None.



LUNGS:  Lungs are well inflated.  There is no evidence of pneumonia or

pulmonary edema.



PLEURA:  No pleural effusion or pneumothorax. 



HEART AND MEDIASTINUM:  The cardiomediastinal silhouette is unremarkable. There

are atherosclerotic calcifications within the aorta.



BONES AND SOFT TISSUES:  No acute osseous abnormality.



UPPER ABDOMEN: No free air under the diaphragm.    



IMPRESSION: 

No acute radiographic abnormality.



Signed by: Dr. Irene Coker MD on 12/13/2019 11:19 AM

## 2021-04-13 ENCOUNTER — HOSPITAL ENCOUNTER (OUTPATIENT)
Dept: HOSPITAL 88 - CT | Age: 86
End: 2021-04-13
Attending: INTERNAL MEDICINE
Payer: MEDICARE

## 2021-04-13 DIAGNOSIS — R42: Primary | ICD-10-CM

## 2021-04-13 PROCEDURE — 70450 CT HEAD/BRAIN W/O DYE: CPT

## 2021-07-09 ENCOUNTER — HOSPITAL ENCOUNTER (OUTPATIENT)
Dept: HOSPITAL 88 - MRI | Age: 86
End: 2021-07-09
Attending: INTERNAL MEDICINE
Payer: MEDICARE

## 2021-07-09 DIAGNOSIS — I48.91: ICD-10-CM

## 2021-07-09 DIAGNOSIS — I10: ICD-10-CM

## 2021-07-09 DIAGNOSIS — R74.8: Primary | ICD-10-CM

## 2021-07-09 DIAGNOSIS — R10.13: ICD-10-CM

## 2021-07-09 DIAGNOSIS — Z79.01: ICD-10-CM

## 2021-07-09 DIAGNOSIS — I11.9: ICD-10-CM

## 2021-07-09 LAB
ALBUMIN SERPL-MCNC: 3.1 G/DL (ref 3.5–5)
ALP SERPL-CCNC: 209 IU/L (ref 40–150)
ALT SERPL-CCNC: 37 IU/L (ref 0–55)
BILIRUB CONJ SERPL-MCNC: 0.8 MG/DL (ref 0–0.5)

## 2021-07-09 PROCEDURE — 36415 COLL VENOUS BLD VENIPUNCTURE: CPT

## 2021-07-09 PROCEDURE — 80076 HEPATIC FUNCTION PANEL: CPT

## 2021-07-09 PROCEDURE — 74183 MRI ABD W/O CNTR FLWD CNTR: CPT

## 2022-01-12 LAB
ANION GAP SERPL CALC-SCNC: 5.9 MMOL/L (ref 8–16)
BASOPHILS # BLD AUTO: 0 10*3/UL (ref 0–0.1)
BASOPHILS NFR BLD AUTO: 0.1 % (ref 0–1)
BUN SERPL-MCNC: 26 MG/DL (ref 7–26)
BUN/CREAT SERPL: 20 (ref 6–25)
CALCIUM SERPL-MCNC: 8.7 MG/DL (ref 8.4–10.2)
CHLORIDE SERPL-SCNC: 99 MMOL/L (ref 98–107)
CO2 SERPL-SCNC: 33 MMOL/L (ref 22–29)
DEPRECATED NEUTROPHILS # BLD AUTO: 5.1 10*3/UL (ref 2.1–6.9)
EGFRCR SERPLBLD CKD-EPI 2021: 53 ML/MIN (ref 60–?)
EOSINOPHIL # BLD AUTO: 0.2 10*3/UL (ref 0–0.4)
EOSINOPHIL NFR BLD AUTO: 1.9 % (ref 0–6)
ERYTHROCYTE [DISTWIDTH] IN CORD BLOOD: 12.7 % (ref 11.7–14.4)
GLUCOSE SERPLBLD-MCNC: 338 MG/DL (ref 74–118)
HCT VFR BLD AUTO: 41.2 % (ref 38.2–49.6)
HGB BLD-MCNC: 13.5 G/DL (ref 14–18)
LYMPHOCYTES # BLD: 2.3 10*3/UL (ref 1–3.2)
LYMPHOCYTES NFR BLD AUTO: 27.1 % (ref 18–39.1)
MCH RBC QN AUTO: 30.5 PG (ref 28–32)
MCHC RBC AUTO-ENTMCNC: 32.8 G/DL (ref 31–35)
MCV RBC AUTO: 93.2 FL (ref 81–99)
MONOCYTES # BLD AUTO: 0.8 10*3/UL (ref 0.2–0.8)
MONOCYTES NFR BLD AUTO: 9 % (ref 4.4–11.3)
NEUTS SEG NFR BLD AUTO: 61.5 % (ref 38.7–80)
PLATELET # BLD AUTO: 170 X10E3/UL (ref 140–360)
POTASSIUM SERPL-SCNC: 3.9 MMOL/L (ref 3.5–5.1)
RBC # BLD AUTO: 4.42 X10E6/UL (ref 4.3–5.7)
SODIUM SERPL-SCNC: 134 MMOL/L (ref 136–145)

## 2022-01-14 ENCOUNTER — HOSPITAL ENCOUNTER (OUTPATIENT)
Dept: HOSPITAL 88 - OR | Age: 87
Discharge: HOME | End: 2022-01-14
Attending: UROLOGY
Payer: MEDICARE

## 2022-01-14 VITALS — DIASTOLIC BLOOD PRESSURE: 89 MMHG | SYSTOLIC BLOOD PRESSURE: 157 MMHG

## 2022-01-14 DIAGNOSIS — Z79.02: ICD-10-CM

## 2022-01-14 DIAGNOSIS — N47.1: Primary | ICD-10-CM

## 2022-01-14 DIAGNOSIS — N13.8: ICD-10-CM

## 2022-01-14 DIAGNOSIS — E11.9: ICD-10-CM

## 2022-01-14 DIAGNOSIS — Z88.7: ICD-10-CM

## 2022-01-14 DIAGNOSIS — N39.0: ICD-10-CM

## 2022-01-14 DIAGNOSIS — E78.5: ICD-10-CM

## 2022-01-14 DIAGNOSIS — N40.0: ICD-10-CM

## 2022-01-14 DIAGNOSIS — N35.919: ICD-10-CM

## 2022-01-14 DIAGNOSIS — N21.0: ICD-10-CM

## 2022-01-14 DIAGNOSIS — Z20.822: ICD-10-CM

## 2022-01-14 DIAGNOSIS — I49.3: ICD-10-CM

## 2022-01-14 DIAGNOSIS — N47.7: ICD-10-CM

## 2022-01-14 DIAGNOSIS — N32.89: ICD-10-CM

## 2022-01-14 DIAGNOSIS — F32.A: ICD-10-CM

## 2022-01-14 DIAGNOSIS — I48.91: ICD-10-CM

## 2022-01-14 DIAGNOSIS — K21.9: ICD-10-CM

## 2022-01-14 DIAGNOSIS — Z79.899: ICD-10-CM

## 2022-01-14 DIAGNOSIS — Z01.812: ICD-10-CM

## 2022-01-14 DIAGNOSIS — N48.89: ICD-10-CM

## 2022-01-14 DIAGNOSIS — N48.0: ICD-10-CM

## 2022-01-14 DIAGNOSIS — I25.2: ICD-10-CM

## 2022-01-14 DIAGNOSIS — Z88.8: ICD-10-CM

## 2022-01-14 DIAGNOSIS — I44.0: ICD-10-CM

## 2022-01-14 DIAGNOSIS — Z86.16: ICD-10-CM

## 2022-01-14 DIAGNOSIS — F41.9: ICD-10-CM

## 2022-01-14 DIAGNOSIS — Z79.4: ICD-10-CM

## 2022-01-14 DIAGNOSIS — I69.398: ICD-10-CM

## 2022-01-14 DIAGNOSIS — I10: ICD-10-CM

## 2022-01-14 DIAGNOSIS — I48.92: ICD-10-CM

## 2022-01-14 DIAGNOSIS — Z88.0: ICD-10-CM

## 2022-01-14 DIAGNOSIS — Z01.810: ICD-10-CM

## 2022-01-14 DIAGNOSIS — J45.909: ICD-10-CM

## 2022-01-14 PROCEDURE — 36415 COLL VENOUS BLD VENIPUNCTURE: CPT

## 2022-01-14 PROCEDURE — 88304 TISSUE EXAM BY PATHOLOGIST: CPT

## 2022-01-14 PROCEDURE — 85025 COMPLETE CBC W/AUTO DIFF WBC: CPT

## 2022-01-14 PROCEDURE — 52281 CYSTOSCOPY AND TREATMENT: CPT

## 2022-01-14 PROCEDURE — 74420 UROGRAPHY RTRGR +-KUB: CPT

## 2022-01-14 PROCEDURE — 54304 REVISION OF PENIS: CPT

## 2022-01-14 PROCEDURE — 93005 ELECTROCARDIOGRAM TRACING: CPT

## 2022-01-14 PROCEDURE — 88305 TISSUE EXAM BY PATHOLOGIST: CPT

## 2022-01-14 PROCEDURE — 55899 UNLISTED PX MALE GENITAL SYS: CPT

## 2022-01-14 PROCEDURE — 80048 BASIC METABOLIC PNL TOTAL CA: CPT

## 2022-01-14 PROCEDURE — 82948 REAGENT STRIP/BLOOD GLUCOSE: CPT

## 2024-08-21 ENCOUNTER — HOSPITAL ENCOUNTER (INPATIENT)
Dept: HOSPITAL 88 - ER | Age: 89
LOS: 2 days | Discharge: HOME | DRG: 291 | End: 2024-08-23
Attending: STUDENT IN AN ORGANIZED HEALTH CARE EDUCATION/TRAINING PROGRAM | Admitting: STUDENT IN AN ORGANIZED HEALTH CARE EDUCATION/TRAINING PROGRAM
Payer: MEDICARE

## 2024-08-21 VITALS
DIASTOLIC BLOOD PRESSURE: 69 MMHG | HEART RATE: 90 BPM | TEMPERATURE: 98.1 F | OXYGEN SATURATION: 96 % | SYSTOLIC BLOOD PRESSURE: 137 MMHG | RESPIRATION RATE: 17 BRPM

## 2024-08-21 VITALS
DIASTOLIC BLOOD PRESSURE: 69 MMHG | RESPIRATION RATE: 17 BRPM | TEMPERATURE: 98.1 F | HEART RATE: 90 BPM | SYSTOLIC BLOOD PRESSURE: 137 MMHG | OXYGEN SATURATION: 96 %

## 2024-08-21 VITALS — HEART RATE: 71 BPM | RESPIRATION RATE: 18 BRPM | OXYGEN SATURATION: 94 %

## 2024-08-21 VITALS — HEART RATE: 67 BPM

## 2024-08-21 VITALS — WEIGHT: 161 LBS | HEIGHT: 68 IN | BODY MASS INDEX: 24.4 KG/M2 | TEMPERATURE: 98.3 F

## 2024-08-21 DIAGNOSIS — I13.0: Primary | ICD-10-CM

## 2024-08-21 DIAGNOSIS — Z86.73: ICD-10-CM

## 2024-08-21 DIAGNOSIS — G47.00: ICD-10-CM

## 2024-08-21 DIAGNOSIS — E11.51: ICD-10-CM

## 2024-08-21 DIAGNOSIS — K21.9: ICD-10-CM

## 2024-08-21 DIAGNOSIS — D69.6: ICD-10-CM

## 2024-08-21 DIAGNOSIS — Z88.0: ICD-10-CM

## 2024-08-21 DIAGNOSIS — F32.9: ICD-10-CM

## 2024-08-21 DIAGNOSIS — Z11.52: ICD-10-CM

## 2024-08-21 DIAGNOSIS — I50.33: ICD-10-CM

## 2024-08-21 DIAGNOSIS — R74.01: ICD-10-CM

## 2024-08-21 DIAGNOSIS — Z88.8: ICD-10-CM

## 2024-08-21 DIAGNOSIS — K76.1: ICD-10-CM

## 2024-08-21 DIAGNOSIS — N18.31: ICD-10-CM

## 2024-08-21 DIAGNOSIS — I48.0: ICD-10-CM

## 2024-08-21 DIAGNOSIS — Z79.4: ICD-10-CM

## 2024-08-21 DIAGNOSIS — I25.10: ICD-10-CM

## 2024-08-21 DIAGNOSIS — E78.5: ICD-10-CM

## 2024-08-21 DIAGNOSIS — N40.0: ICD-10-CM

## 2024-08-21 DIAGNOSIS — J30.9: ICD-10-CM

## 2024-08-21 DIAGNOSIS — E11.22: ICD-10-CM

## 2024-08-21 DIAGNOSIS — Z79.01: ICD-10-CM

## 2024-08-21 DIAGNOSIS — Z88.7: ICD-10-CM

## 2024-08-21 DIAGNOSIS — J96.01: ICD-10-CM

## 2024-08-21 LAB
ALBUMIN SERPL-MCNC: 3.2 G/DL (ref 3.5–5)
ALBUMIN/GLOB SERPL: 1.1 {RATIO} (ref 0.8–2)
ALP SERPL-CCNC: 332 IU/L (ref 40–150)
ALT SERPL-CCNC: 71 IU/L (ref 0–55)
ANION GAP SERPL CALC-SCNC: 11.8 MMOL/L (ref 8–16)
BASOPHILS # BLD AUTO: 0 10*3/UL (ref 0–0.1)
BASOPHILS NFR BLD AUTO: 0.3 % (ref 0–1)
BILIRUB SERPL-MCNC: 1.7 MG/DL (ref 0.2–1.2)
BUN SERPL-MCNC: 23 MG/DL (ref 7–26)
BUN/CREAT SERPL: 19 (ref 6–25)
CALCIUM SERPL-MCNC: 8.6 MG/DL (ref 8.4–10.2)
CHLORIDE SERPL-SCNC: 106 MMOL/L (ref 98–107)
CK SERPL-CCNC: 84 IU/L (ref 30–200)
CO2 SERPL-SCNC: 25 MMOL/L (ref 22–29)
DEPRECATED APTT PLAS QN: 30.9 SECONDS (ref 23.8–35.5)
DEPRECATED INR PLAS: 1.18
DEPRECATED NEUTROPHILS # BLD AUTO: 4.7 10*3/UL (ref 2.1–6.9)
EGFRCR SERPLBLD CKD-EPI 2021: 57 ML/MIN (ref 60–?)
EOSINOPHIL # BLD AUTO: 0.2 10*3/UL (ref 0–0.4)
EOSINOPHIL NFR BLD AUTO: 2 % (ref 0–6)
ERYTHROCYTE [DISTWIDTH] IN CORD BLOOD: 14.8 % (ref 11.7–14.4)
GLOBULIN PLAS-MCNC: 3 G/DL (ref 2.3–3.5)
GLUCOSE SERPLBLD-MCNC: 100 MG/DL (ref 74–118)
HCT VFR BLD AUTO: 34.9 % (ref 38.2–49.6)
HGB BLD-MCNC: 11.3 G/DL (ref 14–18)
LYMPHOCYTES # BLD: 1.9 10*3/UL (ref 1–3.2)
LYMPHOCYTES NFR BLD AUTO: 24.5 % (ref 18–39.1)
MAGNESIUM SERPL-MCNC: 2 MG/DL (ref 1.3–2.1)
MCH RBC QN AUTO: 29.7 PG (ref 28–32)
MCHC RBC AUTO-ENTMCNC: 32.4 G/DL (ref 31–35)
MCV RBC AUTO: 91.6 FL (ref 81–99)
MONOCYTES # BLD AUTO: 1 10*3/UL (ref 0.2–0.8)
MONOCYTES NFR BLD AUTO: 13.2 % (ref 4.4–11.3)
NEUTS SEG NFR BLD AUTO: 59.7 % (ref 38.7–80)
PLATELET # BLD AUTO: 114 X10E3/UL (ref 140–360)
POTASSIUM SERPL-SCNC: 3.8 MMOL/L (ref 3.5–5.1)
PROT SERPL-MCNC: 6.2 G/DL (ref 6.5–8.1)
PROTHROMBIN TIME: 15.6 SECONDS (ref 11.9–14.5)
RBC # BLD AUTO: 3.81 X10E6/UL (ref 4.3–5.7)
SODIUM SERPL-SCNC: 139 MMOL/L (ref 136–145)
TROPONIN I SERPL DL<=0.01 NG/ML-MCNC: 0.04 NG/ML (ref 0–0.3)
WBC # BLD: 7.81 X10E3/UL (ref 4.8–10.8)

## 2024-08-21 PROCEDURE — 85730 THROMBOPLASTIN TIME PARTIAL: CPT

## 2024-08-21 PROCEDURE — 82948 REAGENT STRIP/BLOOD GLUCOSE: CPT

## 2024-08-21 PROCEDURE — 87040 BLOOD CULTURE FOR BACTERIA: CPT

## 2024-08-21 PROCEDURE — 80053 COMPREHEN METABOLIC PANEL: CPT

## 2024-08-21 PROCEDURE — 36415 COLL VENOUS BLD VENIPUNCTURE: CPT

## 2024-08-21 PROCEDURE — 82550 ASSAY OF CK (CPK): CPT

## 2024-08-21 PROCEDURE — 93005 ELECTROCARDIOGRAM TRACING: CPT

## 2024-08-21 PROCEDURE — 83735 ASSAY OF MAGNESIUM: CPT

## 2024-08-21 PROCEDURE — 96372 THER/PROPH/DIAG INJ SC/IM: CPT

## 2024-08-21 PROCEDURE — 80076 HEPATIC FUNCTION PANEL: CPT

## 2024-08-21 PROCEDURE — 82728 ASSAY OF FERRITIN: CPT

## 2024-08-21 PROCEDURE — 76700 US EXAM ABDOM COMPLETE: CPT

## 2024-08-21 PROCEDURE — 84466 ASSAY OF TRANSFERRIN: CPT

## 2024-08-21 PROCEDURE — 85025 COMPLETE CBC W/AUTO DIFF WBC: CPT

## 2024-08-21 PROCEDURE — 94760 N-INVAS EAR/PLS OXIMETRY 1: CPT

## 2024-08-21 PROCEDURE — 94799 UNLISTED PULMONARY SVC/PX: CPT

## 2024-08-21 PROCEDURE — 99284 EMERGENCY DEPT VISIT MOD MDM: CPT

## 2024-08-21 PROCEDURE — 83540 ASSAY OF IRON: CPT

## 2024-08-21 PROCEDURE — 80061 LIPID PANEL: CPT

## 2024-08-21 PROCEDURE — 83036 HEMOGLOBIN GLYCOSYLATED A1C: CPT

## 2024-08-21 PROCEDURE — 84484 ASSAY OF TROPONIN QUANT: CPT

## 2024-08-21 PROCEDURE — 93306 TTE W/DOPPLER COMPLETE: CPT

## 2024-08-21 PROCEDURE — 83880 ASSAY OF NATRIURETIC PEPTIDE: CPT

## 2024-08-21 PROCEDURE — 71045 X-RAY EXAM CHEST 1 VIEW: CPT

## 2024-08-21 PROCEDURE — 82977 ASSAY OF GGT: CPT

## 2024-08-21 PROCEDURE — 85610 PROTHROMBIN TIME: CPT

## 2024-08-21 RX ADMIN — Medication SCH MG: at 21:20

## 2024-08-21 RX ADMIN — INSULIN GLARGINE SCH UNITS: 100 INJECTION, SOLUTION SUBCUTANEOUS at 20:12

## 2024-08-21 RX ADMIN — INSULIN LISPRO SCH UNIT: 100 INJECTION, SOLUTION INTRAVENOUS; SUBCUTANEOUS at 20:11

## 2024-08-21 RX ADMIN — Medication SCH MG: at 20:12

## 2024-08-21 RX ADMIN — FUROSEMIDE ONE MG: 10 INJECTION, SOLUTION INTRAMUSCULAR; INTRAVENOUS at 16:29

## 2024-08-21 RX ADMIN — TRAZODONE HYDROCHLORIDE SCH MG: 50 TABLET ORAL at 21:53

## 2024-08-22 VITALS
DIASTOLIC BLOOD PRESSURE: 69 MMHG | OXYGEN SATURATION: 95 % | TEMPERATURE: 97.5 F | SYSTOLIC BLOOD PRESSURE: 120 MMHG | HEART RATE: 55 BPM | RESPIRATION RATE: 17 BRPM

## 2024-08-22 VITALS
SYSTOLIC BLOOD PRESSURE: 130 MMHG | DIASTOLIC BLOOD PRESSURE: 74 MMHG | RESPIRATION RATE: 16 BRPM | OXYGEN SATURATION: 99 % | TEMPERATURE: 98.3 F | HEART RATE: 64 BPM

## 2024-08-22 VITALS
HEART RATE: 74 BPM | SYSTOLIC BLOOD PRESSURE: 120 MMHG | TEMPERATURE: 97.5 F | RESPIRATION RATE: 18 BRPM | OXYGEN SATURATION: 96 % | DIASTOLIC BLOOD PRESSURE: 69 MMHG

## 2024-08-22 VITALS
SYSTOLIC BLOOD PRESSURE: 156 MMHG | DIASTOLIC BLOOD PRESSURE: 111 MMHG | OXYGEN SATURATION: 98 % | HEART RATE: 80 BPM | RESPIRATION RATE: 20 BRPM | TEMPERATURE: 97.6 F

## 2024-08-22 VITALS
RESPIRATION RATE: 18 BRPM | OXYGEN SATURATION: 95 % | SYSTOLIC BLOOD PRESSURE: 119 MMHG | TEMPERATURE: 97.8 F | HEART RATE: 88 BPM | DIASTOLIC BLOOD PRESSURE: 66 MMHG

## 2024-08-22 VITALS — OXYGEN SATURATION: 96 % | RESPIRATION RATE: 18 BRPM | HEART RATE: 74 BPM

## 2024-08-22 LAB
ALBUMIN SERPL-MCNC: 2.9 G/DL (ref 3.5–5)
ALBUMIN SERPL-MCNC: 3 G/DL (ref 3.5–5)
ALBUMIN/GLOB SERPL: 1 {RATIO} (ref 0.8–2)
ALP SERPL-CCNC: 516 IU/L (ref 40–150)
ALP SERPL-CCNC: 521 IU/L (ref 40–150)
ALT SERPL-CCNC: 191 IU/L (ref 0–55)
ALT SERPL-CCNC: 193 IU/L (ref 0–55)
ANION GAP SERPL CALC-SCNC: 10.6 MMOL/L (ref 8–16)
BASOPHILS # BLD AUTO: 0 10*3/UL (ref 0–0.1)
BASOPHILS NFR BLD AUTO: 0.4 % (ref 0–1)
BILIRUB CONJ SERPL-MCNC: 1.2 MG/DL (ref 0–0.5)
BILIRUB SERPL-MCNC: 1.7 MG/DL (ref 0.2–1.2)
BILIRUB SERPL-MCNC: 1.8 MG/DL (ref 0.2–1.2)
BUN SERPL-MCNC: 21 MG/DL (ref 7–26)
BUN/CREAT SERPL: 17 (ref 6–25)
CALCIUM SERPL-MCNC: 8.4 MG/DL (ref 8.4–10.2)
CHLORIDE SERPL-SCNC: 105 MMOL/L (ref 98–107)
CHOLEST SERPL-MCNC: 87 MD/DL (ref 0–199)
CHOLEST/HDLC SERPL: 2.4 {RATIO} (ref 3.9–4.7)
CK SERPL-CCNC: 70 IU/L (ref 30–200)
CK SERPL-CCNC: 86 IU/L (ref 30–200)
CO2 SERPL-SCNC: 25 MMOL/L (ref 22–29)
DEPRECATED NEUTROPHILS # BLD AUTO: 3 10*3/UL (ref 2.1–6.9)
EGFRCR SERPLBLD CKD-EPI 2021: 55 ML/MIN (ref 60–?)
EOSINOPHIL # BLD AUTO: 0.2 10*3/UL (ref 0–0.4)
EOSINOPHIL NFR BLD AUTO: 3.5 % (ref 0–6)
ERYTHROCYTE [DISTWIDTH] IN CORD BLOOD: 14.6 % (ref 11.7–14.4)
FERRITIN SERPL-MCNC: 95.87 NG/ML (ref 21.81–274.66)
GLOBULIN PLAS-MCNC: 3 G/DL (ref 2.3–3.5)
GLUCOSE SERPLBLD-MCNC: 143 MG/DL (ref 74–118)
HCT VFR BLD AUTO: 36.2 % (ref 38.2–49.6)
HDLC SERPL-MSCNC: 37 MG/DL (ref 40–60)
HGB BLD-MCNC: 11.8 G/DL (ref 14–18)
IRON SATN MFR SERPL: 11 % (ref 15–50)
IRON SERPL-MCNC: 41 UG/DL (ref 65–175)
LDLC SERPL CALC-MCNC: 42 MG/DL (ref 60–130)
LYMPHOCYTES # BLD: 1.4 10*3/UL (ref 1–3.2)
LYMPHOCYTES NFR BLD AUTO: 26.2 % (ref 18–39.1)
MCH RBC QN AUTO: 29.7 PG (ref 28–32)
MCHC RBC AUTO-ENTMCNC: 32.6 G/DL (ref 31–35)
MCV RBC AUTO: 91.2 FL (ref 81–99)
MONOCYTES # BLD AUTO: 0.8 10*3/UL (ref 0.2–0.8)
MONOCYTES NFR BLD AUTO: 14.5 % (ref 4.4–11.3)
NEUTS SEG NFR BLD AUTO: 55.2 % (ref 38.7–80)
PLATELET # BLD AUTO: 107 X10E3/UL (ref 140–360)
POTASSIUM SERPL-SCNC: 3.6 MMOL/L (ref 3.5–5.1)
PROT SERPL-MCNC: 5.9 G/DL (ref 6.5–8.1)
PROT SERPL-MCNC: 6 G/DL (ref 6.5–8.1)
RBC # BLD AUTO: 3.97 X10E6/UL (ref 4.3–5.7)
SODIUM SERPL-SCNC: 137 MMOL/L (ref 136–145)
TIBC SERPL-MCNC: 364 UG/DL (ref 261–478)
TRANSFERRIN SERPL-MCNC: 260 MG/DL (ref 174–364)
TRIGL SERPL-MCNC: 38 MG/DL (ref 0–149)
TROPONIN I SERPL DL<=0.01 NG/ML-MCNC: 0.02 NG/ML (ref 0–0.3)
TROPONIN I SERPL DL<=0.01 NG/ML-MCNC: 0.02 NG/ML (ref 0–0.3)
WBC # BLD: 5.45 X10E3/UL (ref 4.8–10.8)

## 2024-08-22 RX ADMIN — PANTOPRAZOLE SODIUM SCH MG: 40 TABLET, DELAYED RELEASE ORAL at 09:24

## 2024-08-22 RX ADMIN — INSULIN GLARGINE SCH UNITS: 100 INJECTION, SOLUTION SUBCUTANEOUS at 09:23

## 2024-08-22 RX ADMIN — FUROSEMIDE SCH MG: 10 INJECTION, SOLUTION INTRAMUSCULAR; INTRAVENOUS at 09:25

## 2024-08-22 RX ADMIN — RIVAROXABAN SCH MG: 10 TABLET, FILM COATED ORAL at 09:24

## 2024-08-22 RX ADMIN — CITALOPRAM HYDROBROMIDE SCH MG: 20 TABLET, FILM COATED ORAL at 09:24

## 2024-08-22 RX ADMIN — DUTASTERIDE SCH MG: 0.5 CAPSULE, LIQUID FILLED ORAL at 09:25

## 2024-08-22 RX ADMIN — DOCUSATE SODIUM SCH MG: 100 TABLET, FILM COATED ORAL at 09:25

## 2024-08-23 VITALS
TEMPERATURE: 98.3 F | RESPIRATION RATE: 18 BRPM | HEART RATE: 84 BPM | SYSTOLIC BLOOD PRESSURE: 124 MMHG | OXYGEN SATURATION: 97 % | DIASTOLIC BLOOD PRESSURE: 79 MMHG

## 2024-08-23 VITALS
DIASTOLIC BLOOD PRESSURE: 93 MMHG | HEART RATE: 92 BPM | SYSTOLIC BLOOD PRESSURE: 156 MMHG | RESPIRATION RATE: 18 BRPM | OXYGEN SATURATION: 99 % | TEMPERATURE: 97.9 F

## 2024-08-23 VITALS — HEART RATE: 78 BPM | OXYGEN SATURATION: 95 % | RESPIRATION RATE: 18 BRPM

## 2024-08-23 VITALS
DIASTOLIC BLOOD PRESSURE: 93 MMHG | HEART RATE: 92 BPM | TEMPERATURE: 97.9 F | RESPIRATION RATE: 18 BRPM | SYSTOLIC BLOOD PRESSURE: 156 MMHG | OXYGEN SATURATION: 99 %

## 2024-08-23 VITALS
OXYGEN SATURATION: 100 % | SYSTOLIC BLOOD PRESSURE: 133 MMHG | DIASTOLIC BLOOD PRESSURE: 89 MMHG | TEMPERATURE: 97.9 F | RESPIRATION RATE: 18 BRPM | HEART RATE: 78 BPM

## 2024-08-23 VITALS
RESPIRATION RATE: 19 BRPM | OXYGEN SATURATION: 99 % | TEMPERATURE: 97.8 F | DIASTOLIC BLOOD PRESSURE: 75 MMHG | SYSTOLIC BLOOD PRESSURE: 147 MMHG | HEART RATE: 80 BPM

## 2024-08-23 LAB
ALBUMIN SERPL-MCNC: 3.2 G/DL (ref 3.5–5)
ALBUMIN/GLOB SERPL: 0.9 {RATIO} (ref 0.8–2)
ALP SERPL-CCNC: 492 IU/L (ref 40–150)
ALT SERPL-CCNC: 143 IU/L (ref 0–55)
ANION GAP SERPL CALC-SCNC: 11.3 MMOL/L (ref 8–16)
BASOPHILS # BLD AUTO: 0 10*3/UL (ref 0–0.1)
BASOPHILS NFR BLD AUTO: 0.4 % (ref 0–1)
BILIRUB SERPL-MCNC: 1.2 MG/DL (ref 0.2–1.2)
BUN SERPL-MCNC: 19 MG/DL (ref 7–26)
BUN/CREAT SERPL: 16 (ref 6–25)
CALCIUM SERPL-MCNC: 9.2 MG/DL (ref 8.4–10.2)
CHLORIDE SERPL-SCNC: 102 MMOL/L (ref 98–107)
CO2 SERPL-SCNC: 29 MMOL/L (ref 22–29)
DEPRECATED NEUTROPHILS # BLD AUTO: 4.8 10*3/UL (ref 2.1–6.9)
EGFRCR SERPLBLD CKD-EPI 2021: 58 ML/MIN (ref 60–?)
EOSINOPHIL # BLD AUTO: 0.3 10*3/UL (ref 0–0.4)
EOSINOPHIL NFR BLD AUTO: 3.2 % (ref 0–6)
ERYTHROCYTE [DISTWIDTH] IN CORD BLOOD: 14.6 % (ref 11.7–14.4)
GLOBULIN PLAS-MCNC: 3.5 G/DL (ref 2.3–3.5)
GLUCOSE SERPLBLD-MCNC: 99 MG/DL (ref 74–118)
HCT VFR BLD AUTO: 40.2 % (ref 38.2–49.6)
HGB BLD-MCNC: 13.1 G/DL (ref 14–18)
LYMPHOCYTES # BLD: 1.7 10*3/UL (ref 1–3.2)
LYMPHOCYTES NFR BLD AUTO: 22.4 % (ref 18–39.1)
MCH RBC QN AUTO: 29.6 PG (ref 28–32)
MCHC RBC AUTO-ENTMCNC: 32.6 G/DL (ref 31–35)
MCV RBC AUTO: 90.7 FL (ref 81–99)
MONOCYTES # BLD AUTO: 0.9 10*3/UL (ref 0.2–0.8)
MONOCYTES NFR BLD AUTO: 11.9 % (ref 4.4–11.3)
NEUTS SEG NFR BLD AUTO: 61.8 % (ref 38.7–80)
PLATELET # BLD AUTO: 149 X10E3/UL (ref 140–360)
POTASSIUM SERPL-SCNC: 3.3 MMOL/L (ref 3.5–5.1)
PROT SERPL-MCNC: 6.7 G/DL (ref 6.5–8.1)
RBC # BLD AUTO: 4.43 X10E6/UL (ref 4.3–5.7)
SODIUM SERPL-SCNC: 139 MMOL/L (ref 136–145)
WBC # BLD: 7.73 X10E3/UL (ref 4.8–10.8)

## 2024-08-23 RX ADMIN — FUROSEMIDE SCH MG: 10 INJECTION, SOLUTION INTRAMUSCULAR; INTRAVENOUS at 07:45

## 2024-08-23 RX ADMIN — METOPROLOL SUCCINATE ONE MG: 50 TABLET, EXTENDED RELEASE ORAL at 10:02
